# Patient Record
Sex: MALE | URBAN - METROPOLITAN AREA
[De-identification: names, ages, dates, MRNs, and addresses within clinical notes are randomized per-mention and may not be internally consistent; named-entity substitution may affect disease eponyms.]

---

## 2019-10-10 ENCOUNTER — APPOINTMENT (RX ONLY)
Dept: URBAN - METROPOLITAN AREA CLINIC 51 | Facility: CLINIC | Age: 44
Setting detail: DERMATOLOGY
End: 2019-10-10

## 2019-10-10 DIAGNOSIS — D485 NEOPLASM OF UNCERTAIN BEHAVIOR OF SKIN: ICD-10-CM

## 2019-10-10 DIAGNOSIS — Z71.89 OTHER SPECIFIED COUNSELING: ICD-10-CM

## 2019-10-10 DIAGNOSIS — L82.1 OTHER SEBORRHEIC KERATOSIS: ICD-10-CM

## 2019-10-10 DIAGNOSIS — L85.3 XEROSIS CUTIS: ICD-10-CM

## 2019-10-10 DIAGNOSIS — D18.0 HEMANGIOMA: ICD-10-CM

## 2019-10-10 DIAGNOSIS — L81.4 OTHER MELANIN HYPERPIGMENTATION: ICD-10-CM

## 2019-10-10 PROBLEM — D48.5 NEOPLASM OF UNCERTAIN BEHAVIOR OF SKIN: Status: ACTIVE | Noted: 2019-10-10

## 2019-10-10 PROBLEM — D18.01 HEMANGIOMA OF SKIN AND SUBCUTANEOUS TISSUE: Status: ACTIVE | Noted: 2019-10-10

## 2019-10-10 PROCEDURE — 11102 TANGNTL BX SKIN SINGLE LES: CPT

## 2019-10-10 PROCEDURE — 11103 TANGNTL BX SKIN EA SEP/ADDL: CPT

## 2019-10-10 PROCEDURE — ? COUNSELING

## 2019-10-10 PROCEDURE — 99202 OFFICE O/P NEW SF 15 MIN: CPT | Mod: 25

## 2019-10-10 PROCEDURE — ? BIOPSY BY SHAVE METHOD

## 2019-10-10 PROCEDURE — ? FULL BODY SKIN EXAM

## 2019-10-10 PROCEDURE — ? INVENTORY

## 2019-10-10 ASSESSMENT — LOCATION ZONE DERM
LOCATION ZONE: ARM
LOCATION ZONE: TRUNK

## 2019-10-10 ASSESSMENT — LOCATION DETAILED DESCRIPTION DERM
LOCATION DETAILED: LEFT MEDIAL UPPER BACK
LOCATION DETAILED: LEFT INFERIOR MEDIAL MIDBACK
LOCATION DETAILED: LEFT VENTRAL PROXIMAL FOREARM
LOCATION DETAILED: LEFT INFERIOR MEDIAL UPPER BACK
LOCATION DETAILED: LEFT SUPERIOR MEDIAL MIDBACK
LOCATION DETAILED: RIGHT INFERIOR MEDIAL UPPER BACK

## 2019-10-10 ASSESSMENT — LOCATION SIMPLE DESCRIPTION DERM
LOCATION SIMPLE: RIGHT UPPER BACK
LOCATION SIMPLE: LEFT UPPER BACK
LOCATION SIMPLE: LEFT LOWER BACK
LOCATION SIMPLE: LEFT FOREARM

## 2019-10-10 NOTE — PROCEDURE: BIOPSY BY SHAVE METHOD
Silver Nitrate Text: The wound bed was treated with silver nitrate after the biopsy was performed.
Dressing: bandage
Hemostasis: Aluminum Chloride
Was A Bandage Applied: Yes
Render In Bullet Format When Appropriate: No
Detail Level: Detailed
Biopsy Method: double edge Personna blade
Lab: [id number]
Billing Type: Third-Party Bill
Curettage Text: The wound bed was treated with curettage after the biopsy was performed.
Cryotherapy Text: The wound bed was treated with cryotherapy after the biopsy was performed.
Post-Care Instructions: I reviewed with the patient in detail post-care instructions. Patient is to keep the biopsy site dry overnight, and then apply bacitracin twice daily until healed. Patient may apply hydrogen peroxide soaks to remove any crusting.
Additional Anesthesia Volume In Cc (Will Not Render If 0): 0
Size Of Lesion In Cm: 0.4
Anesthesia Type: 1% lidocaine with epinephrine
Lab Facility: [id number]
Notification Instructions: Patient will be notified of biopsy results. However, patient instructed to call the office if not contacted within 2 weeks.
Electrodesiccation Text: The wound bed was treated with electrodesiccation after the biopsy was performed.
Depth Of Biopsy: dermis
Wound Care: Petrolatum
Type Of Destruction Used: Curettage
Biopsy Type: H and E
Consent: Written consent was obtained and risks were reviewed including but not limited to scarring, infection, bleeding, scabbing, incomplete removal, nerve damage and allergy to anesthesia.
Anesthesia Volume In Cc (Will Not Render If 0): 0.5
Electrodesiccation And Curettage Text: The wound bed was treated with electrodesiccation and curettage after the biopsy was performed.

## 2019-10-10 NOTE — HPI: EVALUATION OF SKIN LESION(S)
What Type Of Note Output Would You Prefer (Optional)?: Standard Output
Hpi Title: Evaluation of Skin Lesions
Family Member: Father
Hpi Title: Evaluation of a Skin Lesion
How Severe Are Your Spot(S)?: mild
Have Your Spot(S) Been Treated In The Past?: has not been treated

## 2019-10-31 ENCOUNTER — APPOINTMENT (RX ONLY)
Dept: URBAN - METROPOLITAN AREA CLINIC 51 | Facility: CLINIC | Age: 44
Setting detail: DERMATOLOGY
End: 2019-10-31

## 2019-10-31 PROBLEM — D04.62 CARCINOMA IN SITU OF SKIN OF LEFT UPPER LIMB, INCLUDING SHOULDER: Status: ACTIVE | Noted: 2019-10-31

## 2019-10-31 PROCEDURE — ? CURETTAGE AND DESTRUCTION

## 2019-10-31 PROCEDURE — ? COUNSELING

## 2019-10-31 PROCEDURE — 17263 DSTRJ MAL LES T/A/L 2.1-3.0: CPT

## 2019-10-31 NOTE — PROCEDURE: CURETTAGE AND DESTRUCTION
Detail Level: Detailed
Hide Accession Number?: No
Additional Information: (Optional): The wound was cleaned, and a pressure dressing was applied.  The patient received detailed post-op instructions.
Size Of Lesion In Cm: 1.5
Consent was obtained from the patient. The risks, benefits and alternatives to therapy were discussed in detail. Specifically, the risks of infection, scarring, bleeding, prolonged wound healing, nerve injury, incomplete removal, allergy to anesthesia and recurrence were addressed. Alternatives to ED&C, such as: surgical removal and XRT were also discussed.  Prior to the procedure, the treatment site was clearly identified and confirmed by the patient. All components of Universal Protocol/PAUSE Rule completed.
Cautery Type: aluminum chloride
Render Post-Care Instructions In Note?: yes
Post-Care Instructions: I reviewed with the patient in detail post-care instructions. Patient is to keep the area dry for 48 hours, and not to engage in any swimming until the area is healed. Should the patient develop any fevers, chills, bleeding, severe pain patient will contact the office immediately.
Bill As A Line Item Or As Units: Line Item
What Was Performed First?: Curettage
Size Of Lesion After Curettage: 2.5
Anesthesia Volume In Cc: 0.6
Body Location Override (Optional - Billing Will Still Be Based On Selected Body Map Location If Applicable): left ventral prox forearm
Number Of Curettages: 3
Concentration (Mg/Ml Or Millions Of Plaque Forming Units/Cc): 0.01
Total Volume (Ccs): 1
Anesthesia Type: 1% lidocaine with epinephrine

## 2019-10-31 NOTE — HPI: PROCEDURE (SKIN SURGERY)
Has The Growth Been Previously Biopsied?: has been previously biopsied
Body Location Override (Optional): Left ventral prox forearm